# Patient Record
Sex: MALE
[De-identification: names, ages, dates, MRNs, and addresses within clinical notes are randomized per-mention and may not be internally consistent; named-entity substitution may affect disease eponyms.]

---

## 2018-02-21 DIAGNOSIS — T41.45XA ADVERSE EFFECT OF UNSPECIFIED ANESTHETIC, INITIAL ENCOUNTER: ICD-10-CM

## 2018-02-21 DIAGNOSIS — Z92.89 PERSONAL HISTORY OF OTHER MEDICAL TREATMENT: ICD-10-CM

## 2018-02-28 ENCOUNTER — MESSAGE (OUTPATIENT)
Age: 18
End: 2018-02-28

## 2018-03-01 ENCOUNTER — APPOINTMENT (OUTPATIENT)
Dept: PEDIATRICS | Facility: CLINIC | Age: 18
End: 2018-03-01
Payer: COMMERCIAL

## 2018-03-01 VITALS
BODY MASS INDEX: 19.29 KG/M2 | HEART RATE: 72 BPM | DIASTOLIC BLOOD PRESSURE: 72 MMHG | HEIGHT: 66 IN | WEIGHT: 120 LBS | TEMPERATURE: 98.6 F | RESPIRATION RATE: 18 BRPM | SYSTOLIC BLOOD PRESSURE: 120 MMHG

## 2018-03-01 DIAGNOSIS — I46.9 CARDIAC ARREST, CAUSE UNSPECIFIED: ICD-10-CM

## 2018-03-01 PROCEDURE — 99395 PREV VISIT EST AGE 18-39: CPT

## 2018-07-22 ENCOUNTER — MESSAGE (OUTPATIENT)
Age: 18
End: 2018-07-22

## 2019-09-17 ENCOUNTER — APPOINTMENT (OUTPATIENT)
Dept: PEDIATRICS | Facility: CLINIC | Age: 19
End: 2019-09-17
Payer: MEDICAID

## 2019-09-17 VITALS
DIASTOLIC BLOOD PRESSURE: 70 MMHG | RESPIRATION RATE: 18 BRPM | BODY MASS INDEX: 22.13 KG/M2 | WEIGHT: 141 LBS | HEART RATE: 72 BPM | SYSTOLIC BLOOD PRESSURE: 120 MMHG | HEIGHT: 66.75 IN | TEMPERATURE: 98.2 F

## 2019-09-17 DIAGNOSIS — Z00.00 ENCOUNTER FOR GENERAL ADULT MEDICAL EXAMINATION W/OUT ABNORMAL FINDINGS: ICD-10-CM

## 2019-09-17 PROCEDURE — 96127 BRIEF EMOTIONAL/BEHAV ASSMT: CPT

## 2019-09-17 PROCEDURE — 99395 PREV VISIT EST AGE 18-39: CPT

## 2019-09-17 NOTE — DISCUSSION/SUMMARY
[FreeTextEntry1] : THis is a adolescent patient here for routine exam .I  have recommended that the patient participates in 60 minutes or more of physical activity a day. He has started going to the gym and exercises  with modification due to the discrepancy in his leg  lengths and muscle mass . This is all secondary to his congenital  deformity  , tibial pseudoarthrosis .right leg \par  I explained that it is important to limit screen time to less than 2 hrs a day. Patients diet was discussed and advice given on how to maintain good healthy caloric intake .\par Physical exam is within normal limits . Immunizations were discussed . and patient  defers HPV at this time \par Patient to follow up in 1 yr with Internal Medicine specialist since he has reached the age of 19 .

## 2019-09-17 NOTE — HISTORY OF PRESENT ILLNESS
[Yes] : Patient goes to dentist yearly [Eats meals with family] : eats meals with family [Up to date] : Up to date [Has family members/adults to turn to for help] : has family members/adults to turn to for help [Eats regular meals including adequate fruits and vegetables] : eats regular meals including adequate fruits and vegetables [Calcium source] : calcium source [Drinks non-sweetened liquids] : drinks non-sweetened liquids  [Has friends] : has friends [At least 1 hour of physical activity a day] : at least 1 hour of physical activity a day [Has interests/participates in community activities/volunteers] : has interests/participates in community activities/volunteers. [Drinks alcohol] : drinks alcohol [Uses safety belts/safety equipment] : uses safety belts/safety equipment  [No] : No cigarette smoke exposure [Displays self-confidence] : displays self-confidence [Has ways to cope with stress] : has ways to cope with stress [Sleep Concerns] : no sleep concerns [Has concerns about body or appearance] : does not have concerns about body or appearance [Screen time (except homework) less than 2 hours a day] : no screen time (except homework) less than 2 hours a day [Uses electronic nicotine delivery system] : does not use electronic nicotine delivery system [Uses tobacco] : does not use tobacco [Uses drugs] : does not use drugs  [Impaired/distracted driving] : no impaired/distracted driving [Has problems with sleep] : does not have problems with sleep [Gets depressed, anxious, or irritable/has mood swings] : does not get depressed, anxious, or irritable/has mood swings [Has thought about hurting self or considered suicide] : has not thought about hurting self or considered suicide [de-identified] : working out of school;ll took leave from college [de-identified] : works out  [de-identified] : games , workout  [de-identified] : social  [de-identified] : needs to get drivers license  and car adapted to handicap [FreeTextEntry1] :  Parents denies any Emergency visits or specialized visits unless listed below\par

## 2019-09-17 NOTE — PHYSICAL EXAM
[Alert] : alert [Normocephalic] : normocephalic [No Acute Distress] : no acute distress [EOMI Bilateral] : EOMI bilateral [Clear tympanic membranes with bony landmarks and light reflex present bilaterally] : clear tympanic membranes with bony landmarks and light reflex present bilaterally  [Pink Nasal Mucosa] : pink nasal mucosa [Nonerythematous Oropharynx] : nonerythematous oropharynx [No Palpable Masses] : no palpable masses [Supple, full passive range of motion] : supple, full passive range of motion [Clear to Ausculatation Bilaterally] : clear to auscultation bilaterally [Regular Rate and Rhythm] : regular rate and rhythm [Normal S1, S2 audible] : normal S1, S2 audible [No Murmurs] : no murmurs [Soft] : soft [+2 Femoral Pulses] : +2 femoral pulses [NonTender] : non tender [Non Distended] : non distended [Normoactive Bowel Sounds] : normoactive bowel sounds [No Hepatomegaly] : no hepatomegaly [No Splenomegaly] : no splenomegaly [No Abnormal Lymph Nodes Palpated] : no abnormal lymph nodes palpated [Normal Muscle Tone] : normal muscle tone [No pain or deformities with palpation of bone, muscles, joints] : no pain or deformities with palpation of bone, muscles, joints [No Gait Asymmetry] : no gait asymmetry [+2 Patella DTR] : +2 patella DTR [Straight] : straight [Cranial Nerves Grossly Intact] : cranial nerves grossly intact [No Rash or Lesions] : no rash or lesions

## 2019-12-09 ENCOUNTER — APPOINTMENT (OUTPATIENT)
Dept: ORTHOPEDIC SURGERY | Facility: CLINIC | Age: 19
End: 2019-12-09
Payer: MEDICAID

## 2019-12-09 ENCOUNTER — APPOINTMENT (OUTPATIENT)
Dept: ORTHOPEDIC SURGERY | Facility: CLINIC | Age: 19
End: 2019-12-09

## 2019-12-09 VITALS
HEIGHT: 67 IN | SYSTOLIC BLOOD PRESSURE: 134 MMHG | DIASTOLIC BLOOD PRESSURE: 75 MMHG | WEIGHT: 130 LBS | BODY MASS INDEX: 20.4 KG/M2 | HEART RATE: 76 BPM

## 2019-12-09 DIAGNOSIS — M21.70 UNEQUAL LIMB LENGTH (ACQUIRED), UNSPECIFIED SITE: ICD-10-CM

## 2019-12-09 DIAGNOSIS — Q74.2 OTHER CONGENITAL MALFORMATIONS OF LOWER LIMB(S), INCLUDING PELVIC GIRDLE: ICD-10-CM

## 2019-12-09 PROCEDURE — 99203 OFFICE O/P NEW LOW 30 MIN: CPT

## 2019-12-09 PROCEDURE — 73590 X-RAY EXAM OF LOWER LEG: CPT | Mod: RT

## 2019-12-09 PROCEDURE — 73610 X-RAY EXAM OF ANKLE: CPT | Mod: RT

## 2019-12-09 NOTE — PHYSICAL EXAM
[FreeTextEntry1] : There are no respiratory, cardiac, ENT or GI deficits on exam. \par Psych: Normal mood and affect, non-pressured speech, alert and oriented X3. \par \par \par Gait:\par The patient walks with a valgus gait without use of assistive devices. \par \par RLE:\par \par Skin CDI. Multiple well healed incisional scars. No erythema or drainage. No knee effusion. ROM: 0-130 degrees w/o pain. No varus/valgus instability. No joint line TTP. No TTP over quadriceps/patellar tendon. No TTP over tibial tubercle or pes insertion. No palpable masses. No lymphedema.\par Alignment: valgus\par EHL/FHL/GS/TA 5/5. S/S/SP/DP/T SILT. Toes warm, BCR. Compartments soft.\par Ankle cavovarus deformity. [General Appearance - Well-Appearing] : Well appearing [General Appearance - Alert] : Alert [General Appearance - In No Acute Distress] : in no acute distress

## 2019-12-09 NOTE — DATA REVIEWED
[de-identified] : Xrays R tib/fib, R ankle: Well positioned tibia IMN without signs of loosening. Distal 1/3 tibia prior area of pseudoarthrosis appears well united. Dysplastic distal fibula. No acute fx's or dislocations. Retained wire in the lateral hind foot area. No osseus lesions.

## 2019-12-09 NOTE — HISTORY OF PRESENT ILLNESS
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 He has had multiple surgeries on an annual basis since he was 9 months old, including a free fibula transfer from his left side. He has been treated by multiple surgeons including Dr. Ayo Erickson, Dr. Fuentes, and most recently Dr. Spencer (2018, [facility]). His last surgery in 2018 was complicated by cardiac arrest which required chest compressions and the case was aborted. The etiology of the cardiac arrest remains unclear. This has made a recovery and has not had any further cardiac issues. He currently ambulates without assistance with the use of a right 6 cm shoe lift in addition to a right ankle foot orthosis. Both of these orthotics are quite old  (6-8yrs) and are wearing away. The patient otherwise does not have any pain and has not required the use of any pain medication. He is able to walk for prolonged periods of time off to an hour. He works as a pharmacist. He is here today to inquire about new orthotics. The patient denies any recent fevers, unintentional weight loss or night sweats. Denies a history of neurofibromatosis.

## 2019-12-09 NOTE — DISCUSSION/SUMMARY
[de-identified] : This is a 19-year-old male with congenital pseudoarthrosis of the right tibia who is in need of a new right ankle foot orthotic as well as a right sixth centimeter shoe lift. He currently has no complaints of pain and is able to ambulate, having evidence of radiographic union. 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 Any further procedure to try to correct this alignment is extremely high risk and presents with possible complications including infection, nonunion, malunion and potential cardiac issues given his history of cardiac arrest during his last procedure. I therefore did not recommend any further surgical intervention. I have provided a prescription for a new right 6 cm shoe lift as well as a right ankle foot orthosis. He otherwise has no restrictions on physical activity and should remain weightbearing as tolerated. He may return on a p.r.n. basis. I have also provided information for potentially seen Dr. Ann at [facility] issues may want to consider any further surgical intervention. This plan was discussed in detail with the patient who was in full agreement. All questions were answered.